# Patient Record
Sex: MALE | Race: WHITE
[De-identification: names, ages, dates, MRNs, and addresses within clinical notes are randomized per-mention and may not be internally consistent; named-entity substitution may affect disease eponyms.]

---

## 2021-11-14 ENCOUNTER — HOSPITAL ENCOUNTER (EMERGENCY)
Dept: HOSPITAL 63 - ER | Age: 61
Discharge: HOME | End: 2021-11-14
Payer: COMMERCIAL

## 2021-11-14 VITALS — WEIGHT: 179.02 LBS | HEIGHT: 67 IN | BODY MASS INDEX: 28.1 KG/M2

## 2021-11-14 VITALS — SYSTOLIC BLOOD PRESSURE: 145 MMHG | DIASTOLIC BLOOD PRESSURE: 97 MMHG

## 2021-11-14 DIAGNOSIS — I10: ICD-10-CM

## 2021-11-14 DIAGNOSIS — G45.8: Primary | ICD-10-CM

## 2021-11-14 DIAGNOSIS — F32.9: ICD-10-CM

## 2021-11-14 LAB
ALBUMIN SERPL-MCNC: 3.6 G/DL (ref 3.4–5)
ALBUMIN/GLOB SERPL: 1 {RATIO} (ref 1–1.7)
ALP SERPL-CCNC: 68 U/L (ref 46–116)
ALT SERPL-CCNC: 17 U/L (ref 16–63)
AMPHETAMINE/METHAMPHETAMINE: (no result)
ANION GAP SERPL CALC-SCNC: 9 MMOL/L (ref 6–14)
APTT PPP: YELLOW S
AST SERPL-CCNC: 18 U/L (ref 15–37)
BACTERIA #/AREA URNS HPF: 0 /HPF
BARBITURATES UR-MCNC: (no result) UG/ML
BASOPHILS # BLD AUTO: 0.1 X10^3/UL (ref 0–0.2)
BASOPHILS NFR BLD: 1 % (ref 0–3)
BENZODIAZ UR-MCNC: (no result) UG/L
BILIRUB SERPL-MCNC: 1.2 MG/DL (ref 0.2–1)
BILIRUB UR QL STRIP: (no result)
BUN/CREAT SERPL: 13 (ref 6–20)
CA-I SERPL ISE-MCNC: 12 MG/DL (ref 8–26)
CALCIUM SERPL-MCNC: 8.5 MG/DL (ref 8.5–10.1)
CANNABINOIDS UR-MCNC: (no result) UG/L
CHLORIDE SERPL-SCNC: 103 MMOL/L (ref 98–107)
CO2 SERPL-SCNC: 25 MMOL/L (ref 21–32)
COCAINE UR-MCNC: (no result) NG/ML
CREAT SERPL-MCNC: 0.9 MG/DL (ref 0.7–1.3)
EOSINOPHIL NFR BLD: 0.1 X10^3/UL (ref 0–0.7)
EOSINOPHIL NFR BLD: 2 % (ref 0–3)
ERYTHROCYTE [DISTWIDTH] IN BLOOD BY AUTOMATED COUNT: 12.4 % (ref 11.5–14.5)
FIBRINOGEN PPP-MCNC: CLEAR MG/DL
GFR SERPLBLD BASED ON 1.73 SQ M-ARVRAT: 86.4 ML/MIN
GLOBULIN SER-MCNC: 3.6 G/DL (ref 2.2–3.8)
GLUCOSE SERPL-MCNC: 113 MG/DL (ref 70–99)
GLUCOSE UR STRIP-MCNC: (no result) MG/DL
HCT VFR BLD CALC: 51 % (ref 39–53)
HGB BLD-MCNC: 16.9 G/DL (ref 13–17.5)
LYMPHOCYTES # BLD: 1.6 X10^3/UL (ref 1–4.8)
LYMPHOCYTES NFR BLD AUTO: 29 % (ref 24–48)
MAGNESIUM SERPL-MCNC: 2 MG/DL (ref 1.8–2.4)
MCH RBC QN AUTO: 33 PG (ref 25–35)
MCHC RBC AUTO-ENTMCNC: 33 G/DL (ref 31–37)
MCV RBC AUTO: 99 FL (ref 79–100)
METHADONE SERPL-MCNC: (no result) NG/ML
MONO #: 0.4 X10^3/UL (ref 0–1.1)
MONOCYTES NFR BLD: 8 % (ref 0–9)
NEUT #: 3.3 X10^3UL (ref 1.8–7.7)
NEUTROPHILS NFR BLD AUTO: 59 % (ref 31–73)
NITRITE UR QL STRIP: (no result)
OPIATES UR-MCNC: (no result) NG/ML
PCP SERPL-MCNC: (no result) MG/DL
PLATELET # BLD AUTO: 169 X10^3/UL (ref 140–400)
POTASSIUM SERPL-SCNC: 3.7 MMOL/L (ref 3.5–5.1)
PROT SERPL-MCNC: 7.2 G/DL (ref 6.4–8.2)
RBC # BLD AUTO: 5.16 X10^6/UL (ref 4.3–5.7)
RBC #/AREA URNS HPF: (no result) /HPF (ref 0–2)
SODIUM SERPL-SCNC: 137 MMOL/L (ref 136–145)
SP GR UR STRIP: 1.02
SQUAMOUS #/AREA URNS LPF: (no result) /LPF
UROBILINOGEN UR-MCNC: 0.2 MG/DL
WBC # BLD AUTO: 5.6 X10^3/UL (ref 4–11)
WBC #/AREA URNS HPF: 0 /HPF (ref 0–4)

## 2021-11-14 PROCEDURE — 70496 CT ANGIOGRAPHY HEAD: CPT

## 2021-11-14 PROCEDURE — 84443 ASSAY THYROID STIM HORMONE: CPT

## 2021-11-14 PROCEDURE — 71045 X-RAY EXAM CHEST 1 VIEW: CPT

## 2021-11-14 PROCEDURE — 70450 CT HEAD/BRAIN W/O DYE: CPT

## 2021-11-14 PROCEDURE — 85025 COMPLETE CBC W/AUTO DIFF WBC: CPT

## 2021-11-14 PROCEDURE — 81001 URINALYSIS AUTO W/SCOPE: CPT

## 2021-11-14 PROCEDURE — 85379 FIBRIN DEGRADATION QUANT: CPT

## 2021-11-14 PROCEDURE — 80307 DRUG TEST PRSMV CHEM ANLYZR: CPT

## 2021-11-14 PROCEDURE — 85610 PROTHROMBIN TIME: CPT

## 2021-11-14 PROCEDURE — 99285 EMERGENCY DEPT VISIT HI MDM: CPT

## 2021-11-14 PROCEDURE — 80053 COMPREHEN METABOLIC PANEL: CPT

## 2021-11-14 PROCEDURE — 93005 ELECTROCARDIOGRAM TRACING: CPT

## 2021-11-14 PROCEDURE — 82553 CREATINE MB FRACTION: CPT

## 2021-11-14 PROCEDURE — 84484 ASSAY OF TROPONIN QUANT: CPT

## 2021-11-14 PROCEDURE — 96374 THER/PROPH/DIAG INJ IV PUSH: CPT

## 2021-11-14 PROCEDURE — 36415 COLL VENOUS BLD VENIPUNCTURE: CPT

## 2021-11-14 PROCEDURE — 83735 ASSAY OF MAGNESIUM: CPT

## 2021-11-14 PROCEDURE — 70498 CT ANGIOGRAPHY NECK: CPT

## 2021-11-14 PROCEDURE — 85730 THROMBOPLASTIN TIME PARTIAL: CPT

## 2021-11-14 NOTE — RAD
Chest AP portable at 1123:



Reason for examination: Altered mental status.



The heart size is normal. Mediastinum is unremarkable. Lung fields are clear. No acute bony abnormali
ties are seen.



Impression:



No acute cardiopulmonary disease.



Electronically signed by: Terrie Avendano MD (11/14/2021 11:40 AM) [id number]

## 2021-11-14 NOTE — PHYS DOC
Past History


Past Medical History:  Depression, Hypertension


 (LILIAN FRANKEL APRN)


Past Surgical History:  No Surgical History


 (LILIAN FRANKEL APRN)


Alcohol Use:  None


 (LILIAN FRANKEL APRGURMEET)





Adult General


Chief Complaint


Chief Complaint:  ALTERED MENTAL STATUS





[facility]


HPI





Patient is a 61-year-old male patient with a history of hypertension, depression

who presents to the ED today to be evaluated for confusion that began at 0950 

after having intercourse with the wife.  Patient states he was not able to 

remember the grand son's had a birthday that he attended yesterday. He states 

now he can remember everything very well.  Denies any headache, fever, chest 

pain or shortness of breath.  His blood pressure is elevated in the ED, he 

states he stopped taking his blood pressure medicine 2 months ago because he 

felt he does not need it





 (KALEIGHLILIAN VIDES APRN)





Review of Systems


Review of Systems





Constitutional: Denies fever or chills []


Eyes: Denies change in visual acuity, redness, or eye pain []


HENT: Denies nasal congestion or sore throat []


Respiratory: Denies cough or shortness of breath []


Cardiovascular: No additional information not addressed in HPI []


GI: Denies abdominal pain, nausea, vomiting, bloody stools or diarrhea []


: Denies dysuria or hematuria []


Musculoskeletal: Denies back pain or joint pain []


Integument: Denies rash or skin lesions []


Neurologic: Reports confusion.  Denies headache, focal weakness or sensory 

changes []








All other systems were reviewed and found to be within normal limits, except as 

documented in this note.


 (KALEIGHMAHOGANYLILIAN FELIX APRN)





Current Medications


Current Medications





Current Medications








 Medications


  (Trade)  Dose


 Ordered  Sig/[facility]  Start Time


 Stop Time Status Last Admin


Dose Admin


 


 Nitroglycerin


  (Nitrostat)  0.4 mg  PRN Q5MIN  PRN  11/14/21 10:45


     











 (LILIAN FRANKEL APRN)





Allergies


Allergies





Allergies








Coded Allergies Type Severity Reaction Last Updated Verified


 


  No Known Drug Allergies    11/14/21 No








 (LILIAN FRANKEL APRN)





Physical Exam


Physical Exam





Constitutional: Well developed, well nourished, no acute distress, non-toxic 

appearance. []


HENT: Normocephalic, atraumatic, bilateral external ears normal, oropharynx 

moist, no oral exudates, nose normal. []


Eyes: PERRLA, EOMI, conjunctiva normal, no discharge. [] 


Neck: Normal range of motion, no tenderness, supple, no stridor. [] 


Cardiovascular:Heart rate regular rhythm, no murmur []


Lungs & Thorax:  Bilateral breath sounds clear to auscultation []


Abdomen: Bowel sounds normal, soft, no tenderness, no masses, no pulsatile 

masses. [] 


Skin: Warm, dry, no erythema, no rash. [] 


Back: No tenderness, no CVA tenderness. [] 


Extremities: No tenderness, no cyanosis, no clubbing, ROM intact, no edema. [] 


Neurologic: Alert and oriented X 3, normal motor function, normal sensory 

function, no focal deficits noted.  Cranial nerves II through XII intact


Psychologic: Affect normal, judgement normal, mood normal. []


 (MARLYSLILIAN APRN)





Current Patient Data


Vital Signs





                                   Vital Signs








  Date Time  Temp Pulse Resp B/P (MAP) Pulse Ox O2 Delivery O2 Flow Rate FiO2


 


11/14/21 11:23  99 16 195/105 (135) 99 Room Air  


 


11/14/21 10:28 98.5       








Lab Results





                                Laboratory Tests








Test


 11/14/21


11:00


 


White Blood Count


 5.6 x10^3/uL


(4.0-11.0)


 


Red Blood Count


 5.16 x10^6/uL


(4.30-5.70)


 


Hemoglobin


 16.9 g/dL


(13.0-17.5)


 


Hematocrit


 51.0 %


(39.0-53.0)


 


Mean Corpuscular Volume


 99 fL ()





 


Mean Corpuscular Hemoglobin 33 pg (25-35)  


 


Mean Corpuscular Hemoglobin


Concent 33 g/dL


(31-37)


 


Red Cell Distribution Width


 12.4 %


(11.5-14.5)


 


Platelet Count


 169 x10^3/uL


(140-400)


 


Neutrophils (%) (Auto) 59 % (31-73)  


 


Lymphocytes (%) (Auto) 29 % (24-48)  


 


Monocytes (%) (Auto) 8 % (0-9)  


 


Eosinophils (%) (Auto) 2 % (0-3)  


 


Basophils (%) (Auto) 1 % (0-3)  


 


Neutrophils # (Auto)


 3.3 x10^3uL


(1.8-7.7)


 


Lymphocytes # (Auto)


 1.6 x10^3/uL


(1.0-4.8)


 


Monocytes # (Auto)


 0.4 x10^3/uL


(0.0-1.1)


 


Eosinophils # (Auto)


 0.1 x10^3/uL


(0.0-0.7)


 


Basophils # (Auto)


 0.1 x10^3/uL


(0.0-0.2)


 


Sodium Level


 137 mmol/L


(136-145)


 


Potassium Level


 3.7 mmol/L


(3.5-5.1)


 


Chloride Level


 103 mmol/L


()


 


Carbon Dioxide Level


 25 mmol/L


(21-32)


 


Anion Gap 9 (6-14)  


 


Blood Urea Nitrogen


 12 mg/dL


(8-26)


 


Creatinine


 0.9 mg/dL


(0.7-1.3)


 


Estimated GFR


(Cockcroft-Gault) 86.4  





 


BUN/Creatinine Ratio 13 (6-20)  


 


Glucose Level


 113 mg/dL


(70-99)  H


 


Calcium Level


 8.5 mg/dL


(8.5-10.1)


 


Magnesium Level Pending  


 


Total Bilirubin Pending  


 


Aspartate Amino Transferase


(AST) Pending  





 


Alanine Aminotransferase (ALT) Pending  


 


Alkaline Phosphatase Pending  


 


Creatine Kinase Pending  


 


Creatine Kinase MB (Mass) Pending  


 


Creatine Kinase MB Relative


Index Pending  





 


Total Protein Pending  


 


Albumin Pending  


 


Albumin/Globulin Ratio Pending  


 


Ethyl Alcohol Level


 < 10 mg/dL


(0-10)








 (LILIAN FRANKEL APRN)





EKG


EKG


1058 interpreted by Dr. Zamudio sinus tachycardia  no STEMI []


 (LILIAN FRANKEL APRN)





Radiology/Procedures


Radiology/Procedures


[]PROCEDURE: CT CODE STROKE HEAD WO





Examination: CT head without contrast and CT angiography head and neck with IV 

contrast. 





COMPARISON: None available





History: Altered mental status





TECHNIQUE: Axial CT images of the head was performed without contrast. Axial CT 

angiographic images of the head and neck were performed with IV contrast. 

Coronal and sagittal 3-D MIP reformats are performed. 3-D Volumetric reformats 

of the carotids and [place] were performed.





Exposure: One or more of the following individualized dose reduction techniques 

were utilized for this examination:  1. Automated exposure control  2. 

Adjustment of the mA and/or kV according to patient size  3. Use of iterative 

reconstruction technique








Stenosis calculations for CT, MR, and conventional angiography are based upon 

measurements of the distal ICA diameter in accordance with the NASCET 

methodology. Stenosis calculations for carotid ultrasound studies are derived 

from validated velocity criteria which are known to correlate with the NASCET 

methodology.








CT HEAD





INDICATION: Reason: AMS / Spl. Instructions:  / History: 





COMPARISON: None Available.





Exposure: One or more of the following individualized dose reduction techniques 

were utilized for this examination:  1. Automated exposure control  2. 

Adjustment of the mA and/or kV according to patient size  3. Use of iterative 

reconstruction technique





TECHNIQUE: 5 mm contiguous axial images were obtained from the skull base to the

 vertex in both bone and soft tissue algorithm.  





FINDINGS: 


No abnormal attenuation within the brain parenchyma.  





No evidence of acute intracranial hemorrhage.   No extra-axial fluid 

collections.





No mass effect or midline shift. Ventricular size is appropriate.  Basal 

cisterns are patent.





No fractures identified.Gray-white differentiation is preserved.Globes and 

orbits are within normal limits.   Paranasal sinuses and mastoid air cells are 

clear.   





The origin of the great vessels from the arch of the aorta grossly appears 

unremarkable. The bilateral common carotid arteries, bilateral internal carotid 

arteries are patent. The bilateral middle cerebral arteries, anterior cerebral 

arteries, posterior cerebral arteries are patent. The bilateral vertebral 

arteries, basilar artery is patent. Mild atherosclerotic calcifications 

identified in the bilateral cavernous internal carotid arteries.





Moderate degenerative changes cervical spine.





IMPRESSION:





1.  No acute intracranial findings.


2.  No evidence of occlusive thrombus identified in the visualized arteries of 

the head and neck.








**********FOR INTERNAL CODING PURPOSES**********





Critical result:





Findings discussed with  ER physician at 11/14/2021 11:15 AM.





RESULT CODE: (C)  





  











Electronically signed by: Marquis Munoz MD (11/14/2021 11:21 AM) [id number]














DICTATED AND SIGNED BY:     MARQUIS MUNOZ MD


DATE:     11/14/21 1117





CC: LILIAN FRANKEL; CHLOE PAGE ~MTH0 0


 (LILIAN FRANKEL)





Heart Score


C/O Chest Pain:  N/A


Risk Factors:


Risk Factors:  DM, Current or recent (<one month) smoker, HTN, HLP, family 

history of CAD, obesity.


Risk Scores:


Risk Factors:  DM, Current or recent (<one month) smoker, HTN, HLP, family 

history of CAD, obesity.


 (LILIAN FRANKEL)





Course & Med Decision Making


Course & Med Decision Making


Pertinent Labs and Imaging studies reviewed. (See chart for details)





This is a 61-year-old male patient presenting to the ED today to be evaluated 

for confusion that began at 950 in the morning.  Patient was not able to 

remember the grandson's birthday celebration that was held yesterday.  He 

arrives in the ED alert and oriented x4 and states he can remember everything 

now.





Blood pressure notably to the ED was 204/123 with a heart rate of 108, history 

of hypertension, supposed to be on blood pressure medicine but he states he 

stopped taking medicines months ago, no headache.  No chest pain.





NIHSS  is negative





CT of the head, CT head neck angiography negative.





Labs are negative for any acute findings.  UA is negative





Patient is currently alert oriented times





1325 spoke with Dr. Bravo, he requested patient to be discharged home and 

follow-up with his clinic on Monday which is tomorrow





 (LILIAN FRANKEL)


Course & Med Decision Making


I was the Attending physician on the above date of service of this patient. This

 patient was evaluated, examined, treated, and dispositioned from the emergency 

department by the mid-level practitioner.  I reviewed case with NP, NIH stroke 

scale 0, no indication for TPA.  I agree with decision to contact neurology who 

ultimately recommended close outpatient follow-up and treatment of blood 

pressure that significantly improved with ER intervention and continued mo

nitoring





Electronically signed, Wei Zamudio DO





 (WEI ZAMUDIO DO)


Edison Disclaimer


Dragon Disclaimer


This electronic medical record was generated, in whole or in part, using a voice

 recognition dictation system.


 (LILIAN FRANKEL)


NIH Stroke Scale:  








NIH Stroke Scale Response (Comments) Value


 


Level of Consciousness:                 0 Alert/Responsive 0


 


LOC Questions:                          0 Answers both correctly 0


 


LOC Commands:                           0 Performs both tasks 0


 


Best Gaze:                              0 Normal 0


 


Visual:                                 0 No visual loss 0


 


Facial Palsy:                           0 Normal, symmetrical 0


 


Motor - Left Arm                        0 No drift 0


 


Motor - Right Arm                       0 No drift 0


 


Motor - Left Leg                        0 No drift 0


 


Motor: Right Leg                        0 No drift 0


 


Limb Ataxia:                            0 Absent 0


 


Sensory:                                0 No loss 0


 


Best Language:                          0 Normal 0


 


Dysathria:                              0 Normal 0


 


Extinction and Inattention:             0 Normal 0


 


Total  0











Departure


Departure:


Impression:  


   Primary Impression:  


   TIA (transient ischemic attack)


   Additional Impression:  


   Accelerated hypertension


Disposition:  01 HOME / SELF CARE / HOMELESS


Condition:  STABLE


Referrals:  


CHLOE PAGE (PCP)








JEANNE BRAVO MD


call him tomorrow and set up a follow up appointment


Patient Instructions:  Transient Ischemic Attack





Additional Instructions:  


You were evaluated in the emergency room.  Your CT of the head is negative, your

 CT angio of the head and neck is also negative for any acute findings.  Your 

lab work is negative for any acute findings.  Your blood pressure is running 

high, ensure you start taking your blood pressure medicines right away.  Please 

contact Dr. Bravo's office the neurologist tomorrow morning and set up a follow-

up appointment with him.  Come back to the ED at any point symptoms recur


Scripts


Amlodipine Besylate (AMLODIPINE BESYLATE) 10 Mg Tablet


1 TAB PO DAILY, #14 TAB


   Prov: LILIAN FRANKEL         11/14/21





Problem Qualifiers











LILIAN FRANKEL            Nov 14, 2021 11:38


WEI ZAMUDIO DO                 Nov 16, 2021 06:57

## 2021-11-14 NOTE — RAD
Examination: CT head without contrast and CT angiography head and neck with IV contrast. 



COMPARISON: None available



History: Altered mental status



TECHNIQUE: Axial CT images of the head was performed without contrast. Axial CT angiographic images o
f the head and neck were performed with IV contrast. Coronal and sagittal 3-D MIP reformats are perfo
rmed. 3-D Volumetric reformats of the carotids and [place] were performed.



Exposure: One or more of the following individualized dose reduction techniques were utilized for hospitals
s examination:  1. Automated exposure control  2. Adjustment of the mA and/or kV according to patient
 size  3. Use of iterative reconstruction technique





Stenosis calculations for CT, MR, and conventional angiography are based upon measurements of the dis
kym ICA diameter in accordance with the NASCET methodology. Stenosis calculations for carotid ultraso
und studies are derived from validated velocity criteria which are known to correlate with the NASCET
 methodology.





CT HEAD



INDICATION: Reason: AMS / Spl. Instructions:  / History: 



COMPARISON: None Available.



Exposure: One or more of the following individualized dose reduction techniques were utilized for hospitals
s examination:  1. Automated exposure control  2. Adjustment of the mA and/or kV according to patient
 size  3. Use of iterative reconstruction technique



TECHNIQUE: 5 mm contiguous axial images were obtained from the skull base to the vertex in both bone 
and soft tissue algorithm.  



FINDINGS: 

No abnormal attenuation within the brain parenchyma.  



No evidence of acute intracranial hemorrhage.   No extra-axial fluid collections.



No mass effect or midline shift. Ventricular size is appropriate.  Basal cisterns are patent.



No fractures identified.Gray-white differentiation is preserved.Globes and orbits are within normal l
imits.   Paranasal sinuses and mastoid air cells are clear.   



The origin of the great vessels from the arch of the aorta grossly appears unremarkable. The bilatera
l common carotid arteries, bilateral internal carotid arteries are patent. The bilateral middle cereb
ral arteries, anterior cerebral arteries, posterior cerebral arteries are patent. The bilateral verte
bral arteries, basilar artery is patent. Mild atherosclerotic calcifications identified in the bilate
ral cavernous internal carotid arteries.



Moderate degenerative changes cervical spine.



IMPRESSION:



1.  No acute intracranial findings.

2.  No evidence of occlusive thrombus identified in the visualized arteries of the head and neck.





**********FOR INTERNAL CODING PURPOSES**********



Critical result:



Findings discussed with  ER physician at 11/14/2021 11:15 AM.



RESULT CODE: (C)  



  







Electronically signed by: Marquis Munoz MD (11/14/2021 11:21 AM) [id number]

## 2021-11-14 NOTE — RAD
Examination: CT head without contrast and CT angiography head and neck with IV contrast. 



COMPARISON: None available



History: Altered mental status



TECHNIQUE: Axial CT images of the head was performed without contrast. Axial CT angiographic images o
f the head and neck were performed with IV contrast. Coronal and sagittal 3-D MIP reformats are perfo
rmed. 3-D Volumetric reformats of the carotids and [place] were performed.



Exposure: One or more of the following individualized dose reduction techniques were utilized for [facility]
s examination:  1. Automated exposure control  2. Adjustment of the mA and/or kV according to patient
 size  3. Use of iterative reconstruction technique





Stenosis calculations for CT, MR, and conventional angiography are based upon measurements of the dis
kym ICA diameter in accordance with the NASCET methodology. Stenosis calculations for carotid ultraso
und studies are derived from validated velocity criteria which are known to correlate with the NASCET
 methodology.





CT HEAD



INDICATION: Reason: AMS / Spl. Instructions:  / History: 



COMPARISON: None Available.



Exposure: One or more of the following individualized dose reduction techniques were utilized for [facility]
s examination:  1. Automated exposure control  2. Adjustment of the mA and/or kV according to patient
 size  3. Use of iterative reconstruction technique



TECHNIQUE: 5 mm contiguous axial images were obtained from the skull base to the vertex in both bone 
and soft tissue algorithm.  



FINDINGS: 

No abnormal attenuation within the brain parenchyma.  



No evidence of acute intracranial hemorrhage.   No extra-axial fluid collections.



No mass effect or midline shift. Ventricular size is appropriate.  Basal cisterns are patent.



No fractures identified.Gray-white differentiation is preserved.Globes and orbits are within normal l
imits.   Paranasal sinuses and mastoid air cells are clear.   



The origin of the great vessels from the arch of the aorta grossly appears unremarkable. The bilatera
l common carotid arteries, bilateral internal carotid arteries are patent. The bilateral middle cereb
ral arteries, anterior cerebral arteries, posterior cerebral arteries are patent. The bilateral verte
bral arteries, basilar artery is patent. Mild atherosclerotic calcifications identified in the bilate
ral cavernous internal carotid arteries.



Moderate degenerative changes cervical spine.



IMPRESSION:



1.  No acute intracranial findings.

2.  No evidence of occlusive thrombus identified in the visualized arteries of the head and neck.





**********FOR INTERNAL CODING PURPOSES**********



Critical result:



Findings discussed with  ER physician at 11/14/2021 11:15 AM.



RESULT CODE: (C)  



  







Electronically signed by: Marquis Munoz MD (11/14/2021 11:21 AM) [id number]

## 2021-11-14 NOTE — EKG
[facility] [address], [place] [address]

Test Date:    2021               Test Time:    10:54:23

Pat Name:     JEANETTE SMITH            Department:   

Patient ID:   [id number]           Room:          

Gender:       M                        Technician:   BE

:          1960               Requested By: LILIAN FRANKEL

Order Number: [id number]            Reading MD:   Aleksandar Ann MD

                                 Measurements

Intervals                              Axis          

Rate:         105                      P:            36

HI:           160                      QRS:          22

QRSD:         78                       T:            31

QT:           332                                    

QTc:          443                                    

                           Interpretive Statements

SINUS TACHYCARDIA

Electronically Signed On 2021 13:37:01 CST by Aleksandar Ann MD